# Patient Record
Sex: FEMALE | Race: WHITE | NOT HISPANIC OR LATINO | Employment: OTHER | URBAN - METROPOLITAN AREA
[De-identification: names, ages, dates, MRNs, and addresses within clinical notes are randomized per-mention and may not be internally consistent; named-entity substitution may affect disease eponyms.]

---

## 2020-10-15 ENCOUNTER — HISTORICAL (OUTPATIENT)
Dept: ADMINISTRATIVE | Facility: HOSPITAL | Age: 80
End: 2020-10-15

## 2020-10-15 LAB
ABS NEUT (OLG): 4.94 X10(3)/MCL (ref 2.1–9.2)
ALBUMIN SERPL-MCNC: 4.2 GM/DL (ref 3.4–5)
ALBUMIN/GLOB SERPL: 1.4 RATIO (ref 1.1–2)
ALP SERPL-CCNC: 60 UNIT/L (ref 40–150)
ALT SERPL-CCNC: 14 UNIT/L (ref 0–55)
APPEARANCE, UA: CLEAR
AST SERPL-CCNC: 22 UNIT/L (ref 5–34)
BACTERIA SPEC CULT: ABNORMAL /HPF
BASOPHILS # BLD AUTO: 0 X10(3)/MCL (ref 0–0.2)
BASOPHILS NFR BLD AUTO: 0 %
BILIRUB SERPL-MCNC: 0.3 MG/DL
BILIRUB UR QL STRIP: NEGATIVE
BILIRUBIN DIRECT+TOT PNL SERPL-MCNC: 0.1 MG/DL (ref 0–0.5)
BILIRUBIN DIRECT+TOT PNL SERPL-MCNC: 0.2 MG/DL (ref 0–0.8)
BUN SERPL-MCNC: 18.4 MG/DL (ref 9.8–20.1)
CALCIUM SERPL-MCNC: 9 MG/DL (ref 8.4–10.2)
CHLORIDE SERPL-SCNC: 108 MMOL/L (ref 98–107)
CHOLEST SERPL-MCNC: 146 MG/DL
CHOLEST/HDLC SERPL: 3 {RATIO} (ref 0–5)
CO2 SERPL-SCNC: 24 MMOL/L (ref 23–31)
COLOR UR: ABNORMAL
CREAT SERPL-MCNC: 0.62 MG/DL (ref 0.55–1.02)
CREAT UR-MCNC: 232.7 MG/DL (ref 45–106)
EOSINOPHIL # BLD AUTO: 0.2 X10(3)/MCL (ref 0–0.9)
EOSINOPHIL NFR BLD AUTO: 2 %
ERYTHROCYTE [DISTWIDTH] IN BLOOD BY AUTOMATED COUNT: 12.7 % (ref 11.5–17)
EST. AVERAGE GLUCOSE BLD GHB EST-MCNC: 208.7 MG/DL
GLOBULIN SER-MCNC: 3.1 GM/DL (ref 2.4–3.5)
GLUCOSE (UA): NEGATIVE
GLUCOSE SERPL-MCNC: 130 MG/DL (ref 82–115)
HBA1C MFR BLD: 8.9 %
HCT VFR BLD AUTO: 45.4 % (ref 37–47)
HDLC SERPL-MCNC: 47 MG/DL (ref 35–60)
HGB BLD-MCNC: 15 GM/DL (ref 12–16)
HGB UR QL STRIP: NEGATIVE
KETONES UR QL STRIP: ABNORMAL
LDLC SERPL CALC-MCNC: 28 MG/DL (ref 50–140)
LEUKOCYTE ESTERASE UR QL STRIP: ABNORMAL
LYMPHOCYTES # BLD AUTO: 2.7 X10(3)/MCL (ref 0.6–4.6)
LYMPHOCYTES NFR BLD AUTO: 30 %
MCH RBC QN AUTO: 33.3 PG (ref 27–31)
MCHC RBC AUTO-ENTMCNC: 33 GM/DL (ref 33–36)
MCV RBC AUTO: 100.9 FL (ref 80–94)
MICROALBUMIN UR-MCNC: 30.9 UG/ML
MICROALBUMIN/CREAT RATIO PNL UR: 13.3 MG/GM CR (ref 0–30)
MONOCYTES # BLD AUTO: 1.3 X10(3)/MCL (ref 0.1–1.3)
MONOCYTES NFR BLD AUTO: 14 %
NEUTROPHILS # BLD AUTO: 4.94 X10(3)/MCL (ref 2.1–9.2)
NEUTROPHILS NFR BLD AUTO: 54 %
NITRITE UR QL STRIP: NEGATIVE
PH UR STRIP: 5 [PH] (ref 5–9)
PLATELET # BLD AUTO: 348 X10(3)/MCL (ref 130–400)
PMV BLD AUTO: 10.1 FL (ref 9.4–12.4)
POTASSIUM SERPL-SCNC: 6.1 MMOL/L (ref 3.5–5.1)
PROT SERPL-MCNC: 7.3 GM/DL (ref 5.8–7.6)
PROT UR QL STRIP: ABNORMAL
RBC # BLD AUTO: 4.5 X10(6)/MCL (ref 4.2–5.4)
RBC #/AREA URNS HPF: ABNORMAL /[HPF]
SODIUM SERPL-SCNC: 148 MMOL/L (ref 136–145)
SP GR UR STRIP: 1.03 (ref 1–1.03)
SQUAMOUS EPITHELIAL, UA: ABNORMAL
TRIGL SERPL-MCNC: 355 MG/DL (ref 37–140)
TSH SERPL-ACNC: 0.63 UIU/ML (ref 0.35–4.94)
UROBILINOGEN UR STRIP-ACNC: 0.2
VLDLC SERPL CALC-MCNC: 71 MG/DL
WBC # SPEC AUTO: 9.2 X10(3)/MCL (ref 4.5–11.5)
WBC #/AREA URNS HPF: ABNORMAL /[HPF]

## 2020-11-02 ENCOUNTER — HISTORICAL (OUTPATIENT)
Dept: ADMINISTRATIVE | Facility: HOSPITAL | Age: 80
End: 2020-11-02

## 2020-11-02 LAB
BUN SERPL-MCNC: 19.2 MG/DL (ref 9.8–20.1)
CALCIUM SERPL-MCNC: 9.2 MG/DL (ref 8.4–10.2)
CHLORIDE SERPL-SCNC: 105 MMOL/L (ref 98–107)
CO2 SERPL-SCNC: 22 MMOL/L (ref 23–31)
CREAT SERPL-MCNC: 0.83 MG/DL (ref 0.55–1.02)
CREAT/UREA NIT SERPL: 23
GLUCOSE SERPL-MCNC: 141 MG/DL (ref 82–115)
POTASSIUM SERPL-SCNC: 4.9 MMOL/L (ref 3.5–5.1)
SODIUM SERPL-SCNC: 138 MMOL/L (ref 136–145)

## 2021-03-11 ENCOUNTER — HISTORICAL (OUTPATIENT)
Dept: ADMINISTRATIVE | Facility: HOSPITAL | Age: 81
End: 2021-03-11

## 2021-03-11 LAB
ABS NEUT (OLG): 5.9 X10(3)/MCL (ref 2.1–9.2)
ALBUMIN SERPL-MCNC: 4 GM/DL (ref 3.4–4.8)
ALBUMIN/GLOB SERPL: 1.1 RATIO (ref 1.1–2)
ALP SERPL-CCNC: 69 UNIT/L (ref 40–150)
ALT SERPL-CCNC: 16 UNIT/L (ref 0–55)
AST SERPL-CCNC: 23 UNIT/L (ref 5–34)
BASOPHILS # BLD AUTO: 0 X10(3)/MCL (ref 0–0.2)
BASOPHILS NFR BLD AUTO: 0 %
BILIRUB SERPL-MCNC: 0.3 MG/DL
BILIRUBIN DIRECT+TOT PNL SERPL-MCNC: 0.1 MG/DL (ref 0–0.5)
BILIRUBIN DIRECT+TOT PNL SERPL-MCNC: 0.2 MG/DL (ref 0–0.8)
BNP BLD-MCNC: 20.6 PG/ML
BUN SERPL-MCNC: 21.6 MG/DL (ref 9.8–20.1)
CALCIUM SERPL-MCNC: 9.1 MG/DL (ref 8.4–10.2)
CHLORIDE SERPL-SCNC: 104 MMOL/L (ref 98–107)
CHOLEST SERPL-MCNC: 171 MG/DL
CHOLEST/HDLC SERPL: 3 {RATIO} (ref 0–5)
CO2 SERPL-SCNC: 22 MMOL/L (ref 23–31)
CREAT SERPL-MCNC: 1.05 MG/DL (ref 0.55–1.02)
EOSINOPHIL # BLD AUTO: 0.2 X10(3)/MCL (ref 0–0.9)
EOSINOPHIL NFR BLD AUTO: 2 %
ERYTHROCYTE [DISTWIDTH] IN BLOOD BY AUTOMATED COUNT: 12.7 % (ref 11.5–17)
EST. AVERAGE GLUCOSE BLD GHB EST-MCNC: 197.2 MG/DL
GLOBULIN SER-MCNC: 3.6 GM/DL (ref 2.4–3.5)
GLUCOSE SERPL-MCNC: 205 MG/DL (ref 82–115)
HBA1C MFR BLD: 8.5 %
HCT VFR BLD AUTO: 42.3 % (ref 37–47)
HDLC SERPL-MCNC: 56 MG/DL (ref 35–60)
HGB BLD-MCNC: 13.6 GM/DL (ref 12–16)
LDLC SERPL CALC-MCNC: 37 MG/DL (ref 50–140)
LYMPHOCYTES # BLD AUTO: 2.5 X10(3)/MCL (ref 0.6–4.6)
LYMPHOCYTES NFR BLD AUTO: 26 %
MCH RBC QN AUTO: 33 PG (ref 27–31)
MCHC RBC AUTO-ENTMCNC: 32.2 GM/DL (ref 33–36)
MCV RBC AUTO: 102.7 FL (ref 80–94)
MONOCYTES # BLD AUTO: 1.1 X10(3)/MCL (ref 0.1–1.3)
MONOCYTES NFR BLD AUTO: 11 %
NEUTROPHILS # BLD AUTO: 5.9 X10(3)/MCL (ref 2.1–9.2)
NEUTROPHILS NFR BLD AUTO: 60 %
PLATELET # BLD AUTO: 294 X10(3)/MCL (ref 130–400)
PMV BLD AUTO: 10.2 FL (ref 9.4–12.4)
POTASSIUM SERPL-SCNC: 5.1 MMOL/L (ref 3.5–5.1)
PROT SERPL-MCNC: 7.6 GM/DL (ref 5.8–7.6)
RBC # BLD AUTO: 4.12 X10(6)/MCL (ref 4.2–5.4)
SODIUM SERPL-SCNC: 137 MMOL/L (ref 136–145)
TRIGL SERPL-MCNC: 392 MG/DL (ref 37–140)
TSH SERPL-ACNC: 1.81 UIU/ML (ref 0.35–4.94)
VLDLC SERPL CALC-MCNC: 78 MG/DL
WBC # SPEC AUTO: 9.8 X10(3)/MCL (ref 4.5–11.5)

## 2021-03-25 ENCOUNTER — HISTORICAL (OUTPATIENT)
Dept: ADMINISTRATIVE | Facility: HOSPITAL | Age: 81
End: 2021-03-25

## 2021-04-06 ENCOUNTER — HISTORICAL (OUTPATIENT)
Dept: CARDIOLOGY | Facility: HOSPITAL | Age: 81
End: 2021-04-06

## 2021-12-06 ENCOUNTER — HISTORICAL (OUTPATIENT)
Dept: RESPIRATORY THERAPY | Facility: HOSPITAL | Age: 81
End: 2021-12-06

## 2021-12-07 ENCOUNTER — HISTORICAL (OUTPATIENT)
Dept: ADMINISTRATIVE | Facility: HOSPITAL | Age: 81
End: 2021-12-07

## 2021-12-07 LAB
APPEARANCE, UA: ABNORMAL
BACTERIA SPEC CULT: ABNORMAL /HPF
BILIRUB UR QL STRIP: NEGATIVE
BUN SERPL-MCNC: 25 MG/DL (ref 9.8–20.1)
CALCIUM SERPL-MCNC: 9.4 MG/DL (ref 8.7–10.5)
CHLORIDE SERPL-SCNC: 106 MMOL/L (ref 98–107)
CO2 SERPL-SCNC: 22 MMOL/L (ref 23–31)
COLOR UR: YELLOW
CREAT SERPL-MCNC: 1.01 MG/DL (ref 0.55–1.02)
CREAT UR-MCNC: 119 MG/DL (ref 45–106)
CREAT/UREA NIT SERPL: 25
EST. AVERAGE GLUCOSE BLD GHB EST-MCNC: 274.7 MG/DL
GLUCOSE (UA): ABNORMAL
GLUCOSE SERPL-MCNC: 239 MG/DL (ref 82–115)
HBA1C MFR BLD: 11.2 %
HGB UR QL STRIP: NEGATIVE
KETONES UR QL STRIP: NEGATIVE
LEUKOCYTE ESTERASE UR QL STRIP: ABNORMAL
MICROALBUMIN UR-MCNC: 250.6 UG/ML
MICROALBUMIN/CREAT RATIO PNL UR: 210.6 MG/GM CR (ref 0–30)
NITRITE UR QL STRIP: POSITIVE
PH UR STRIP: 5 [PH] (ref 5–9)
POTASSIUM SERPL-SCNC: 5.5 MMOL/L (ref 3.5–5.1)
PROT UR QL STRIP: ABNORMAL
RBC #/AREA URNS HPF: ABNORMAL /[HPF]
SODIUM SERPL-SCNC: 139 MMOL/L (ref 136–145)
SP GR UR STRIP: 1.03 (ref 1–1.03)
SQUAMOUS EPITHELIAL, UA: ABNORMAL /HPF (ref 0–4)
UROBILINOGEN UR STRIP-ACNC: 0.2
WBC #/AREA URNS HPF: 90 /HPF (ref 0–3)

## 2022-01-01 ENCOUNTER — TELEPHONE (OUTPATIENT)
Dept: INTERNAL MEDICINE | Facility: CLINIC | Age: 82
End: 2022-01-01
Payer: MEDICARE

## 2022-01-01 ENCOUNTER — HISTORICAL (OUTPATIENT)
Dept: ADMINISTRATIVE | Facility: HOSPITAL | Age: 82
End: 2022-01-01

## 2022-01-01 ENCOUNTER — CLINICAL SUPPORT (OUTPATIENT)
Dept: INTERNAL MEDICINE | Facility: CLINIC | Age: 82
End: 2022-01-01
Payer: MEDICARE

## 2022-01-01 ENCOUNTER — HOSPITAL ENCOUNTER (OUTPATIENT)
Dept: RADIOLOGY | Facility: HOSPITAL | Age: 82
Discharge: HOME | End: 2022-08-12
Payer: MEDICARE

## 2022-01-01 ENCOUNTER — OFFICE VISIT (OUTPATIENT)
Dept: INTERNAL MEDICINE | Facility: CLINIC | Age: 82
End: 2022-01-01
Payer: MEDICARE

## 2022-01-01 ENCOUNTER — EXTERNAL HOME HEALTH (OUTPATIENT)
Dept: HOME HEALTH SERVICES | Facility: HOSPITAL | Age: 82
End: 2022-01-01
Payer: MEDICARE

## 2022-01-01 ENCOUNTER — DOCUMENT SCAN (OUTPATIENT)
Dept: HOME HEALTH SERVICES | Facility: HOSPITAL | Age: 82
End: 2022-01-01
Payer: MEDICARE

## 2022-01-01 ENCOUNTER — DOCUMENTATION ONLY (OUTPATIENT)
Dept: ADMINISTRATIVE | Facility: HOSPITAL | Age: 82
End: 2022-01-01
Payer: MEDICARE

## 2022-01-01 VITALS
OXYGEN SATURATION: 97 % | WEIGHT: 169.5 LBS | TEMPERATURE: 97 F | BODY MASS INDEX: 30.03 KG/M2 | DIASTOLIC BLOOD PRESSURE: 62 MMHG | SYSTOLIC BLOOD PRESSURE: 114 MMHG | HEIGHT: 63 IN | RESPIRATION RATE: 16 BRPM | HEART RATE: 110 BPM

## 2022-01-01 VITALS
RESPIRATION RATE: 16 BRPM | HEART RATE: 105 BPM | BODY MASS INDEX: 31.87 KG/M2 | WEIGHT: 179.88 LBS | SYSTOLIC BLOOD PRESSURE: 136 MMHG | DIASTOLIC BLOOD PRESSURE: 74 MMHG | HEIGHT: 63 IN | TEMPERATURE: 97 F | OXYGEN SATURATION: 98 %

## 2022-01-01 VITALS
OXYGEN SATURATION: 96 % | DIASTOLIC BLOOD PRESSURE: 56 MMHG | SYSTOLIC BLOOD PRESSURE: 134 MMHG | HEIGHT: 64 IN | BODY MASS INDEX: 30.9 KG/M2 | WEIGHT: 181 LBS

## 2022-01-01 DIAGNOSIS — B37.9 YEAST INFECTION: Primary | ICD-10-CM

## 2022-01-01 DIAGNOSIS — R05.9 COUGH: ICD-10-CM

## 2022-01-01 DIAGNOSIS — E11.65 TYPE 2 DIABETES MELLITUS WITH HYPERGLYCEMIA, WITHOUT LONG-TERM CURRENT USE OF INSULIN: Primary | ICD-10-CM

## 2022-01-01 DIAGNOSIS — R06.09 DOE (DYSPNEA ON EXERTION): ICD-10-CM

## 2022-01-01 DIAGNOSIS — E03.9 HYPOTHYROIDISM, UNSPECIFIED TYPE: ICD-10-CM

## 2022-01-01 DIAGNOSIS — Z23 FLU VACCINE NEED: Primary | ICD-10-CM

## 2022-01-01 DIAGNOSIS — J44.9 CHRONIC OBSTRUCTIVE PULMONARY DISEASE, UNSPECIFIED COPD TYPE: ICD-10-CM

## 2022-01-01 DIAGNOSIS — I50.9 CONGESTIVE HEART FAILURE, UNSPECIFIED HF CHRONICITY, UNSPECIFIED HEART FAILURE TYPE: Primary | ICD-10-CM

## 2022-01-01 DIAGNOSIS — J45.909 ASTHMA, UNSPECIFIED ASTHMA SEVERITY, UNSPECIFIED WHETHER COMPLICATED, UNSPECIFIED WHETHER PERSISTENT: ICD-10-CM

## 2022-01-01 DIAGNOSIS — R09.81 SINUS CONGESTION: Primary | ICD-10-CM

## 2022-01-01 DIAGNOSIS — E55.9 VITAMIN D DEFICIENCY: ICD-10-CM

## 2022-01-01 DIAGNOSIS — R09.81 SINUS CONGESTION: ICD-10-CM

## 2022-01-01 DIAGNOSIS — E66.9 OBESITY, UNSPECIFIED CLASSIFICATION, UNSPECIFIED OBESITY TYPE, UNSPECIFIED WHETHER SERIOUS COMORBIDITY PRESENT: ICD-10-CM

## 2022-01-01 LAB
ABS NEUT (OLG): 4.76 (ref 2.1–9.2)
ALBUMIN SERPL-MCNC: 3.8 G/DL (ref 3.4–4.8)
ALBUMIN/GLOB SERPL: 1.2 {RATIO} (ref 1.1–2)
ALP SERPL-CCNC: 50 U/L (ref 40–150)
ALT SERPL-CCNC: 19 U/L (ref 0–55)
ANION GAP SERPL CALC-SCNC: 12 MEQ/L
APPEARANCE, UA: CLEAR
AST SERPL-CCNC: 24 U/L (ref 5–34)
BACTERIA SPEC CULT: NORMAL
BASOPHILS # BLD AUTO: 0 10*3/UL (ref 0–0.2)
BASOPHILS NFR BLD AUTO: 1 %
BILIRUB SERPL-MCNC: 0.3 MG/DL
BILIRUB UR QL STRIP: NEGATIVE
BILIRUBIN DIRECT+TOT PNL SERPL-MCNC: 0.1 (ref 0–0.5)
BILIRUBIN DIRECT+TOT PNL SERPL-MCNC: 0.2 (ref 0–0.8)
BUDDING YEAST: NORMAL
BUN SERPL-MCNC: 15.8 MG/DL (ref 9.8–20.1)
BUN SERPL-MCNC: 19.6 MG/DL (ref 9.8–20.1)
CALCIUM SERPL-MCNC: 9.1 MG/DL (ref 8.7–10.5)
CALCIUM SERPL-MCNC: 9.4 MG/DL (ref 8.4–10.2)
CASTS, UA: NORMAL
CHLORIDE SERPL-SCNC: 107 MMOL/L (ref 98–107)
CHLORIDE SERPL-SCNC: 109 MMOL/L (ref 98–107)
CHOLEST SERPL-MCNC: 133 MG/DL
CHOLEST/HDLC SERPL: 3 {RATIO} (ref 0–5)
CO2 SERPL-SCNC: 16 MMOL/L (ref 23–31)
CO2 SERPL-SCNC: 20 MMOL/L (ref 23–31)
COLOR UR: YELLOW
CREAT SERPL-MCNC: 0.82 MG/DL (ref 0.55–1.02)
CREAT SERPL-MCNC: 0.94 MG/DL (ref 0.55–1.02)
CREAT UR-MCNC: 150.9 MG/DL (ref 45–106)
CREAT/UREA NIT SERPL: 21
CRYSTALS: NORMAL
DEPRECATED CALCIDIOL+CALCIFEROL SERPL-MC: 24.1 NG/ML (ref 30–80)
EOSINOPHIL # BLD AUTO: 0.1 10*3/UL (ref 0–0.9)
EOSINOPHIL NFR BLD AUTO: 2 %
ERYTHROCYTE [DISTWIDTH] IN BLOOD BY AUTOMATED COUNT: 12.4 % (ref 11.5–17)
EST. AVERAGE GLUCOSE BLD GHB EST-MCNC: 148.5 MG/DL
EST. AVERAGE GLUCOSE BLD GHB EST-MCNC: 151.3 MG/DL
GLOBULIN SER-MCNC: 3.3 G/DL (ref 2.4–3.5)
GLUCOSE (UA): NEGATIVE
GLUCOSE SERPL-MCNC: 165 MG/DL (ref 82–115)
GLUCOSE SERPL-MCNC: 169 MG/DL (ref 82–115)
HBA1C MFR BLD: 6.8 %
HBA1C MFR BLD: 6.9 %
HCT VFR BLD AUTO: 38.7 % (ref 37–47)
HDLC SERPL-MCNC: 53 MG/DL (ref 35–60)
HEMOLYSIS INTERF INDEX SERPL-ACNC: 36
HGB BLD-MCNC: 12.7 G/DL (ref 12–16)
HGB UR QL STRIP: NEGATIVE
ICTERIC INTERF INDEX SERPL-ACNC: 0
KETONES UR QL STRIP: NORMAL
LDLC SERPL CALC-MCNC: 37 MG/DL (ref 50–140)
LEFT EYE DM RETINOPATHY: NEGATIVE
LEUKOCYTE ESTERASE UR QL STRIP: NORMAL
LIPEMIC INTERF INDEX SERPL-ACNC: 2
LYMPHOCYTES # BLD AUTO: 2.5 10*3/UL (ref 0.6–4.6)
LYMPHOCYTES NFR BLD AUTO: 29 %
MANUAL DIFF? (OHS): NO
MCH RBC QN AUTO: 33.1 PG (ref 27–31)
MCHC RBC AUTO-ENTMCNC: 32.8 G/DL (ref 33–36)
MCV RBC AUTO: 100.8 FL (ref 80–94)
MICROALBUMIN UR-MCNC: 36.9
MICROALBUMIN/CREAT RATIO PNL UR: 24.5 (ref 0–30)
MONOCYTES # BLD AUTO: 1.1 10*3/UL (ref 0.1–1.3)
MONOCYTES NFR BLD AUTO: 12 %
NEUTROPHILS # BLD AUTO: 4.76 10*3/UL (ref 2.1–9.2)
NEUTROPHILS NFR BLD AUTO: 56 %
NITRITE UR QL STRIP: NEGATIVE
PH UR STRIP: 5.5 [PH] (ref 5–9)
PLATELET # BLD AUTO: 269 10*3/UL (ref 130–400)
PMV BLD AUTO: 10.2 FL (ref 9.4–12.4)
POTASSIUM SERPL-SCNC: 4.4 MMOL/L (ref 3.5–5.1)
POTASSIUM SERPL-SCNC: 5.1 MMOL/L (ref 3.5–5.1)
PROT SERPL-MCNC: 7.1 G/DL (ref 5.8–7.6)
PROT UR QL STRIP: NEGATIVE
RBC # BLD AUTO: 3.84 10*6/UL (ref 4.2–5.4)
RBC #/AREA URNS HPF: NORMAL /[HPF] (ref 0–2)
RIGHT EYE DM RETINOPATHY: NEGATIVE
SMALL ROUND CELLS, UA: NORMAL
SODIUM SERPL-SCNC: 136 MMOL/L (ref 136–145)
SODIUM SERPL-SCNC: 141 MMOL/L (ref 136–145)
SP GR UR STRIP: 1.02 (ref 1–1.03)
SPERM URNS QL MICRO: NORMAL
SQUAMOUS EPITHELIAL, UA: NORMAL (ref 0–4)
TRIGL SERPL-MCNC: 214 MG/DL (ref 37–140)
TSH SERPL-ACNC: 0.2 M[IU]/L (ref 0.35–4.94)
UROBILINOGEN UR STRIP-ACNC: 0.2
VIT B12 SERPL-MCNC: 286 PG/ML (ref 213–816)
VLDLC SERPL CALC-MCNC: 43 MG/DL
WBC # SPEC AUTO: 8.5 10*3/UL (ref 4.5–11.5)
WBC #/AREA URNS HPF: NORMAL /[HPF] (ref 0–2)

## 2022-01-01 PROCEDURE — G0179 MD RECERTIFICATION HHA PT: HCPCS | Mod: ,,, | Performed by: INTERNAL MEDICINE

## 2022-01-01 PROCEDURE — 99214 PR OFFICE/OUTPT VISIT, EST, LEVL IV, 30-39 MIN: ICD-10-PCS | Mod: ,,, | Performed by: INTERNAL MEDICINE

## 2022-01-01 PROCEDURE — G0179 PR HOME HEALTH MD RECERTIFICATION: ICD-10-PCS | Mod: ,,, | Performed by: INTERNAL MEDICINE

## 2022-01-01 PROCEDURE — 71046 X-RAY EXAM CHEST 2 VIEWS: CPT | Mod: TC

## 2022-01-01 PROCEDURE — 80048 BASIC METABOLIC PNL TOTAL CA: CPT | Performed by: INTERNAL MEDICINE

## 2022-01-01 PROCEDURE — G0180 MD CERTIFICATION HHA PATIENT: HCPCS | Mod: ,,, | Performed by: INTERNAL MEDICINE

## 2022-01-01 PROCEDURE — 99213 OFFICE O/P EST LOW 20 MIN: CPT | Mod: ,,, | Performed by: INTERNAL MEDICINE

## 2022-01-01 PROCEDURE — 83036 HEMOGLOBIN GLYCOSYLATED A1C: CPT | Performed by: INTERNAL MEDICINE

## 2022-01-01 PROCEDURE — 99214 OFFICE O/P EST MOD 30 MIN: CPT | Mod: ,,, | Performed by: INTERNAL MEDICINE

## 2022-01-01 PROCEDURE — 99213 PR OFFICE/OUTPT VISIT, EST, LEVL III, 20-29 MIN: ICD-10-PCS | Mod: ,,, | Performed by: INTERNAL MEDICINE

## 2022-01-01 PROCEDURE — 90694 VACC AIIV4 NO PRSRV 0.5ML IM: CPT | Mod: ,,, | Performed by: INTERNAL MEDICINE

## 2022-01-01 PROCEDURE — G0008 ADMIN INFLUENZA VIRUS VAC: HCPCS | Mod: ,,, | Performed by: INTERNAL MEDICINE

## 2022-01-01 PROCEDURE — G0008 FLU VACCINE - QUADRIVALENT - ADJUVANTED: ICD-10-PCS | Mod: ,,, | Performed by: INTERNAL MEDICINE

## 2022-01-01 PROCEDURE — 90694 FLU VACCINE - QUADRIVALENT - ADJUVANTED: ICD-10-PCS | Mod: ,,, | Performed by: INTERNAL MEDICINE

## 2022-01-01 PROCEDURE — G0180 PR HOME HEALTH MD CERTIFICATION: ICD-10-PCS | Mod: ,,, | Performed by: INTERNAL MEDICINE

## 2022-01-01 PROCEDURE — 36415 COLL VENOUS BLD VENIPUNCTURE: CPT | Performed by: INTERNAL MEDICINE

## 2022-01-01 RX ORDER — PANTOPRAZOLE SODIUM 40 MG/1
40 TABLET, DELAYED RELEASE ORAL DAILY
Qty: 30 TABLET | Refills: 2 | Status: SHIPPED | OUTPATIENT
Start: 2022-01-01 | End: 2022-01-01 | Stop reason: SDUPTHER

## 2022-01-01 RX ORDER — PANTOPRAZOLE SODIUM 40 MG/1
40 TABLET, DELAYED RELEASE ORAL DAILY
Qty: 90 TABLET | Refills: 3 | Status: SHIPPED | OUTPATIENT
Start: 2022-01-01

## 2022-01-01 RX ORDER — GLIPIZIDE 5 MG/1
5 TABLET, FILM COATED, EXTENDED RELEASE ORAL DAILY
COMMUNITY
Start: 2021-12-23

## 2022-01-01 RX ORDER — METHYLPREDNISOLONE 4 MG/1
TABLET ORAL
Qty: 21 EACH | Refills: 0 | Status: SHIPPED | OUTPATIENT
Start: 2022-01-01 | End: 2022-01-01

## 2022-01-01 RX ORDER — DICLOFENAC SODIUM 10 MG/G
2 GEL TOPICAL DAILY
COMMUNITY

## 2022-01-01 RX ORDER — FLUCONAZOLE 150 MG/1
150 TABLET ORAL DAILY
Qty: 1 TABLET | Refills: 0 | Status: SHIPPED | OUTPATIENT
Start: 2022-01-01 | End: 2022-01-01

## 2022-01-01 RX ORDER — PANTOPRAZOLE SODIUM 40 MG/1
40 TABLET, DELAYED RELEASE ORAL DAILY
Qty: 90 TABLET | Refills: 3 | Status: SHIPPED | OUTPATIENT
Start: 2022-01-01 | End: 2022-01-01 | Stop reason: SDUPTHER

## 2022-01-01 RX ORDER — METHYLPREDNISOLONE 4 MG/1
TABLET ORAL
Qty: 21 EACH | Refills: 0 | Status: SHIPPED | OUTPATIENT
Start: 2022-01-01 | End: 2022-01-01 | Stop reason: SDUPTHER

## 2022-01-01 RX ORDER — DICLOFENAC SODIUM 1 MG/ML
1 SOLUTION/ DROPS OPHTHALMIC 4 TIMES DAILY
COMMUNITY

## 2022-06-14 NOTE — TELEPHONE ENCOUNTER
----- Message from Baudilio Martinez sent at 6/14/2022  8:06 AM CDT -----  Pantoprazole SOD EC TB ([facility])

## 2022-06-14 NOTE — TELEPHONE ENCOUNTER
----- Message from Billy Jason sent at 6/14/2022  1:22 PM CDT -----  .Type:  Needs Medical Advice    Who Called: Haley  Symptoms (please be specific):    How long has patient had these symptoms:    Pharmacy name and phone #:    Would the patient rather a call back or a response via [facility]?   Best Call Back Number: [phone]   Additional Information: She has a question about her rx Pantotrazole, [facility] is telling her that the doctor has cancelled the rx.

## 2022-06-15 NOTE — TELEPHONE ENCOUNTER
----- Message from Billy Jason sent at 6/15/2022  9:22 AM CDT -----  .Type:  Needs Medical Advice    Who Called: Haley  Symptoms (please be specific):    How long has patient had these symptoms:    Pharmacy name and phone #:    Would the patient rather a call back or a response via [facility]?   Best Call Back Number: [phone]  Additional Information: The patient needs a call back a mistake was made on her medication. She has spoken with a nurse late yesterday afternoon. She told me she spent the day on the phone trying to get her medication and now it is not the correct one.

## 2022-06-22 NOTE — TELEPHONE ENCOUNTER
----- Message from Yesenia Kemp MA sent at 6/22/2022  3:30 PM CDT -----  Regarding: Previsit 06.30.22 @2  Visit w/Cesar Valdez     1. Are there any outstanding tasks in the patient's chart?  No      2.  Is there any documentation of task in the chart? No      3.  Has the patient been in an ER, urgent care clinic, or been admitted since the last visit? No      4. Has the patient seen any other healthcare providers (doctors) since the last visit? No      5.  Has the patient had any blood work or x-rays done since the last visit? No      6. Is patient signed up for patient portal?

## 2022-06-30 PROBLEM — E11.65 TYPE 2 DIABETES MELLITUS WITH HYPERGLYCEMIA, WITHOUT LONG-TERM CURRENT USE OF INSULIN: Status: ACTIVE | Noted: 2022-01-01

## 2022-06-30 PROBLEM — J44.9 CHRONIC OBSTRUCTIVE PULMONARY DISEASE, UNSPECIFIED COPD TYPE: Status: ACTIVE | Noted: 2022-01-01

## 2022-06-30 NOTE — PROGRESS NOTES
Subjective:      Patient ID: Haley Dumont is a 82 y.o. female.    Chief Complaint: Diabetes (3 month F/U)      HPI:  81-year-old  female presents to clinic for DM revisit  Last A1C 6.9 3-2022  Patient states she lives with her elderly , she herself has a history of stroke, states she is unable to give herself insulin injections.   Previous PCP in [place]  Dr. Reed for cardiology--history of stroke, PTCA with stent, CHF  Dr. Pleitez.  Last C-scope in 2010, does not want repeat  Fasting sugar of 136  Needs labs today  Needs diabetic eye exam  PCV 20---we are currently out        Problem List Items Addressed This Visit        Pulmonary    Chronic obstructive pulmonary disease, unspecified COPD type       Endocrine    Type 2 diabetes mellitus with hyperglycemia, without long-term current use of insulin - Primary    Current Assessment & Plan     Recheck A1c and BMP today  Referral for diabetic eye exam           Relevant Medications    glipiZIDE (GLUCOTROL) 5 MG TR24    Other Relevant Orders    Ambulatory referral/consult to Optometry    Basic Metabolic Panel    Hemoglobin A1C              Past Medical History:  Past Medical History:   Diagnosis Date    DM (diabetes mellitus)     Hypothyroidism, unspecified     Mild intermittent asthma, uncomplicated     Obesity, unspecified      Past Surgical History:   Procedure Laterality Date    CATARACT EXTRACTION  2010    HYSTERECTOMY  1974     Review of patient's allergies indicates:   Allergen Reactions    Morphine Anaphylaxis    Oxycodone-aspirin Anaphylaxis    Shellfish containing products      Other reaction(s): Hives    Sulfa (sulfonamide antibiotics)      Other reaction(s): Hives     Current Outpatient Medications on File Prior to Visit   Medication Sig Dispense Refill    amLODIPine (NORVASC) 10 MG tablet       blood sugar diagnostic (ACCU-CHEK GUIDE TEST STRIPS [facility])   Accu Chek Test strips, See Instructions, Use to test Blood glucose once a  day, # 50 EA, 11 Refill(s), Pharmacy: [facility] [address] [id number], 163, cm, Height/Length Dosing, 12/13/21 15:53:00 CST, 82.1, kg, Weight Dosing, 12/13/21 15:53:00 CST      blood sugar diagnostic Strp   Accu Chek Glucometer, See Instructions, Use to check Blood sugar Once a day, # 1 EA, 0 Refill(s), Pharmacy: [facility] [address] [facility], 163, cm, Height/Length Dosing, 12/13/21 15:53:00 CST, 82.1, kg, Weight Dosing, 12/13/21 15:53:00 CST      clopidogreL (PLAVIX) 75 mg tablet       diclofenac sodium (VOLTAREN) 1 % Gel Apply 2 g topically once daily.      empagliflozin (JARDIANCE) 10 mg tablet Take 10 mg by mouth once daily.      fluticasone propionate (FLOVENT DISKUS) 100 mcg/actuation inhaler Inhale into the lungs 2 (two) times daily. Controller      lancing device/lancets (ACCU-CHEK SOFT DEV LANCETS MISC)   Accu Chek Lancets, See Instructions, Use to test Blood glucose once a day, # 50 EA, 11 Refill(s), Pharmacy: [facility] [address] [facility], 163, cm, Height/Length Dosing, 12/13/21 15:53:00 CST, 82.1, kg, Weight Dosing, 12/13/21 15:53:00 CST      levothyroxine (SYNTHROID) 125 MCG tablet       lisinopriL (PRINIVIL,ZESTRIL) 2.5 MG tablet       metFORMIN (GLUCOPHAGE) 1000 MG tablet once daily at 6am.      metoprolol succinate (TOPROL-XL) 100 MG 24 hr tablet       montelukast (SINGULAIR) 10 mg tablet       rosuvastatin (CRESTOR) 40 MG Tab Take 10 mg by mouth every evening.      [DISCONTINUED] pantoprazole (PROTONIX) 40 MG tablet Take 1 tablet (40 mg total) by mouth once daily. 90 tablet 3    diclofenac (VOLTAREN) 0.1 % ophthalmic solution 1 drop 4 (four) times daily.      glipiZIDE (GLUCOTROL) 5 MG TR24 Take 5 mg by mouth Daily.      [DISCONTINUED] semaglutide (OZEMPIC) 0.25 mg or 0.5 mg(2 mg/1.5 mL) pen injector Inject 0.5 mg into the skin every 7 days.       No current facility-administered medications on file prior to visit.     Social History     Socioeconomic History    Marital status:    Tobacco Use     "Smoking status: Never Smoker    Smokeless tobacco: Never Used   Substance and Sexual Activity    Alcohol use: Not Currently    Drug use: Never    Sexual activity: Not Currently     History reviewed. No pertinent family history.        Review of Systems  Constitutional: No fever,  no fatigue, no chills, no night sweats, no weight gain, no weight loss, no changes in appetite.   Eye: No redness, no acute vision loss, no blurred vision, no double vision, no eye pain  ENMT: No sore throat, no nasal drainage, no nose bleeds,  no headache, no ear pain, no ear drainage, no acute hearing loss  Respiratory: No cough, no sputum production, no shortness of breath, no hemoptysis, no wheezing.  Cardiovascular: No chest pain, no chest tightness, no MOTA, no PND, no orthopnea, no swelling, no palpitations.  Gastrointestinal: No abdominal pain, no nausea, no vomiting, no diarrhea, no constipation, no difficulty swallowing, no change in bowel habits, no rectal bleeding  Genitourinary: no urgency, no frequency, no burning or pain when urinating, no blood in urine, no incontinence  Heme/Lymph: No easy bruising and/or bleeding, no swollen or painful glands.  Endocrine: No polyuria, no polydipsia, no polyphagia, no heat or cold intolerance.  Musculoskeletal: No muscle pain, no muscle weakness, no joint pain, no red or swollen joints.  Integumentary: No rash, no pruritis, no hair or nail changes.  Neurologic: No dizziness, no fainting, no tremors, no tingling and/ or numbness.  Psychiatric: No anxiety, no depression, no memory loss  All Other ROS: Negative with exception of what is documented in the history of present illness     Objective:   /74 (BP Location: Left arm, Patient Position: Sitting, BP Method: Medium (Manual))   Pulse 105   Temp 97.2 °F (36.2 °C) (Temporal)   Resp 16   Ht 5' 3" (1.6 m)   Wt 81.6 kg (179 lb 14.4 oz)   SpO2 98%   BMI 31.87 kg/m²     Physical Exam  General : Alert and oriented, No acute " distress, well, developed, well nourished, afebrile   Eye : PERRLA. EOMI.   HEENT : Normocephalic. Neck supple.  Musculoskeletal : Normal ROM.  No muscle tenderness.  Integumentary : Warm to touch. Intact. No rash.    Neurologic : Alert and oriented. No focal deficits.   Psychiatric : Cooperative, Appropriate mood & affect. Normal judgment.          Assessment:     1. Type 2 diabetes mellitus with hyperglycemia, without long-term current use of insulin    2. Chronic obstructive pulmonary disease, unspecified COPD type                  Plan:       I have discontinued Haley Dumont's semaglutide. I am also having her maintain her amLODIPine, metFORMIN, clopidogreL, levothyroxine, lisinopriL, metoprolol succinate, montelukast, fluticasone propionate, rosuvastatin, JARDIANCE, blood sugar diagnostic, lancing device/lancets (ACCU-CHEK SOFT DEV LANCETS MISC), blood sugar diagnostic (ACCU-CHEK GUIDE TEST STRIPS MISC), glipiZIDE, diclofenac, diclofenac sodium, and pantoprazole.      Problem List Items Addressed This Visit        Pulmonary    Chronic obstructive pulmonary disease, unspecified COPD type       Endocrine    Type 2 diabetes mellitus with hyperglycemia, without long-term current use of insulin - Primary     Recheck A1c and BMP today  Referral for diabetic eye exam           Relevant Medications    glipiZIDE (GLUCOTROL) 5 MG TR24    Other Relevant Orders    Ambulatory referral/consult to Optometry    Basic Metabolic Panel    Hemoglobin A1C            Haley was seen today for diabetes.    Diagnoses and all orders for this visit:    Type 2 diabetes mellitus with hyperglycemia, without long-term current use of insulin  -     Ambulatory referral/consult to Optometry; Future  -     Cancel: Hemoglobin A1C; Future  -     Basic Metabolic Panel  -     Hemoglobin A1C    Chronic obstructive pulmonary disease, unspecified COPD type    Other orders  -     pantoprazole (PROTONIX) 40 MG tablet; Take 1 tablet (40 mg total) by  mouth once daily.            Medications Ordered This Encounter   Medications    pantoprazole (PROTONIX) 40 MG tablet     Sig: Take 1 tablet (40 mg total) by mouth once daily.     Dispense:  90 tablet     Refill:  3     [unfilled]  Orders Placed This Encounter   Procedures    Basic Metabolic Panel    Hemoglobin A1C    Ambulatory referral/consult to Optometry     Standing Status:   Future     Standing Expiration Date:   7/30/2023     Referral Priority:   Routine     Referral Type:   [facility] (Optometry)     Referral Reason:   Specialty Services Required     Referred to Provider:   Will Stern OD     Requested Specialty:   Optometry     Number of Visits Requested:   1       Medication List with Changes/Refills   Current Medications    AMLODIPINE (NORVASC) 10 MG TABLET        BLOOD SUGAR DIAGNOSTIC (ACCU-CHEK GUIDE TEST STRIPS [facility]      Accu Chek Test strips, See Instructions, Use to test Blood glucose once a day, # 50 EA, 11 Refill(s), Pharmacy: [place] [facility] [address] [id number], 163, cm, Height/Length Dosing, 12/13/21 15:53:00 CST, 82.1, kg, Weight Dosing, 12/13/21 15:53:00 CST    BLOOD SUGAR DIAGNOSTIC STRP      Accu Chek Glucometer, See Instructions, Use to check Blood sugar Once a day, # 1 EA, 0 Refill(s), Pharmacy: [place] [address] [facility], 163, cm, Height/Length Dosing, 12/13/21 15:53:00 CST, 82.1, kg, Weight Dosing, 12/13/21 15:53:00 CST    CLOPIDOGREL (PLAVIX) 75 MG TABLET        DICLOFENAC (VOLTAREN) 0.1 % OPHTHALMIC SOLUTION    1 drop 4 (four) times daily.    DICLOFENAC SODIUM (VOLTAREN) 1 % GEL    Apply 2 g topically once daily.    EMPAGLIFLOZIN (JARDIANCE) 10 MG TABLET    Take 10 mg by mouth once daily.    FLUTICASONE PROPIONATE (FLOVENT DISKUS) 100 MCG/ACTUATION INHALER    Inhale into the lungs 2 (two) times daily. Controller    GLIPIZIDE (GLUCOTROL) 5 MG TR24    Take 5 mg by mouth Daily.    LANCING DEVICE/LANCETS (ACCU-CHEK SOFT DEV LANCETS [facility]      Accu Chek Lancets, See Instructions, Use to test Blood  glucose once a day, # 50 EA, 11 Refill(s), Pharmacy: [facility] [address] [facility], 163, cm, Height/Length Dosing, 12/13/21 15:53:00 CST, 82.1, kg, Weight Dosing, 12/13/21 15:53:00 CST    LEVOTHYROXINE (SYNTHROID) 125 MCG TABLET        LISINOPRIL (PRINIVIL,ZESTRIL) 2.5 MG TABLET        METFORMIN (GLUCOPHAGE) 1000 MG TABLET    once daily at 6am.    METOPROLOL SUCCINATE (TOPROL-XL) 100 MG 24 HR TABLET        MONTELUKAST (SINGULAIR) 10 MG TABLET        ROSUVASTATIN (CRESTOR) 40 MG TAB    Take 10 mg by mouth every evening.   Changed and/or Refilled Medications    Modified Medication Previous Medication    PANTOPRAZOLE (PROTONIX) 40 MG TABLET pantoprazole (PROTONIX) 40 MG tablet       Take 1 tablet (40 mg total) by mouth once daily.    Take 1 tablet (40 mg total) by mouth once daily.   Discontinued Medications    SEMAGLUTIDE (OZEMPIC) 0.25 MG OR 0.5 MG(2 MG/1.5 ML) PEN INJECTOR    Inject 0.5 mg into the skin every 7 days.      Medication List with Changes/Refills   Current Medications    AMLODIPINE (NORVASC) 10 MG TABLET           Start Date: 4/20/2022 End Date: --    BLOOD SUGAR DIAGNOSTIC (ACCU-CHEK GUIDE TEST STRIPS [facility]      Accu Chek Test strips, See Instructions, Use to test Blood glucose once a day, # 50 EA, 11 Refill(s), Pharmacy: [facility] [address] [facility], 163, cm, Height/Length Dosing, 12/13/21 15:53:00 CST, 82.1, kg, Weight Dosing, 12/13/21 15:53:00 CST       Start Date: 1/24/2022 End Date: --    BLOOD SUGAR DIAGNOSTIC STRP      Accu Chek Glucometer, See Instructions, Use to check Blood sugar Once a day, # 1 EA, 0 Refill(s), Pharmacy: [facility] [place] [address] [facility], 163, cm, Height/Length Dosing, 12/13/21 15:53:00 CST, 82.1, kg, Weight Dosing, 12/13/21 15:53:00 CST       Start Date: 1/24/2022 End Date: --    CLOPIDOGREL (PLAVIX) 75 MG TABLET           Start Date: 4/11/2022 End Date: --    DICLOFENAC (VOLTAREN) 0.1 % OPHTHALMIC SOLUTION    1 drop 4 (four) times daily.       Start Date: --        End Date: --    DICLOFENAC SODIUM  (VOLTAREN) 1 % GEL    Apply 2 g topically once daily.       Start Date: --        End Date: --    EMPAGLIFLOZIN (JARDIANCE) 10 MG TABLET    Take 10 mg by mouth once daily.       Start Date: --        End Date: --    FLUTICASONE PROPIONATE (FLOVENT DISKUS) 100 MCG/ACTUATION INHALER    Inhale into the lungs 2 (two) times daily. Controller       Start Date: --        End Date: --    GLIPIZIDE (GLUCOTROL) 5 MG TR24    Take 5 mg by mouth Daily.       Start Date: 12/23/2021End Date: --    LANCING DEVICE/LANCETS (ACCU-CHEK SOFT DEV LANCETS MISC)      Accu Chek Lancets, See Instructions, Use to test Blood glucose once a day, # 50 EA, 11 Refill(s), Pharmacy: [place] [address] [facility], 163, cm, Height/Length Dosing, 12/13/21 15:53:00 CST, 82.1, kg, Weight Dosing, 12/13/21 15:53:00 CST       Start Date: 1/24/2022 End Date: --    LEVOTHYROXINE (SYNTHROID) 125 MCG TABLET           Start Date: 5/30/2022 End Date: --    LISINOPRIL (PRINIVIL,ZESTRIL) 2.5 MG TABLET           Start Date: 4/25/2022 End Date: --    METFORMIN (GLUCOPHAGE) 1000 MG TABLET    once daily at 6am.       Start Date: 1/3/2022  End Date: --    METOPROLOL SUCCINATE (TOPROL-XL) 100 MG 24 HR TABLET           Start Date: 3/29/2022 End Date: --    MONTELUKAST (SINGULAIR) 10 MG TABLET           Start Date: 3/20/2022 End Date: --    ROSUVASTATIN (CRESTOR) 40 MG TAB    Take 10 mg by mouth every evening.       Start Date: --        End Date: --   Changed and/or Refilled Medications    Modified Medication Previous Medication    PANTOPRAZOLE (PROTONIX) 40 MG TABLET pantoprazole (PROTONIX) 40 MG tablet       Take 1 tablet (40 mg total) by mouth once daily.    Take 1 tablet (40 mg total) by mouth once daily.       Start Date: 6/30/2022 End Date: --    Start Date: 6/14/2022 End Date: 6/30/2022   Discontinued Medications    SEMAGLUTIDE (OZEMPIC) 0.25 MG OR 0.5 MG(2 MG/1.5 ML) PEN INJECTOR    Inject 0.5 mg into the skin every 7 days.       Start Date: 12/13/2021End Date: 6/30/2022         Needs PCV 20 but we are currently out will give her her dose at her next visit    Follow up in about 6 months (around 12/30/2022) for WELLNESS, NURSE PRACTITIONER, with labs prior to visit.

## 2022-07-01 NOTE — TELEPHONE ENCOUNTER
----- Message from Deng Call MD sent at 7/1/2022  8:35 AM CDT -----  Labs look great, continue current management A1c at 6.8.

## 2022-07-08 NOTE — TELEPHONE ENCOUNTER
----- Message from Carmen Morales sent at 2022 10:46 AM CDT -----  Regarding: advice  Type:  Needs Medical Advice    Who Called: pt  Would the patient rather a call back or a response via [facility]? C/b  Best Call Back Number: [phone]  Additional Information: pt received samples of Jardiance from pcp but most the samples have .  Pt wants to know what to do with the  samples and would like new replacement ones

## 2022-07-08 NOTE — TELEPHONE ENCOUNTER
Spoke with patient who states she received samples of Jardiance from our office which are  she also states the pills do not look like the ones she was previously taking. Advised patient to come in  new samples and bring in the ones that were given to her. Patient verbalized understanding

## 2022-07-25 NOTE — TELEPHONE ENCOUNTER
----- Message from Amanda Patel sent at 7/25/2022  1:19 PM CDT -----  Regarding: Meds  .Type:  Needs Medical Advice    Who Called: Pt  Symptoms (please be specific):  SOB, coughing  How long has patient had these symptoms:    Pharmacy name and phone #:  [facility] [address]  Would the patient rather a call back or a response via [facility]? Call back  Best Call Back Number: [phone]  Additional Information: Wants Prednisone called in fro symptoms listed above..

## 2022-07-26 NOTE — TELEPHONE ENCOUNTER
Pt took COVID test and it was negative yesterday 7/25/22.  Her symptoms are: cough for 3 weeks, and runny nose.   She stated that she has had this before (related to her asthma) and you have sent in a medrol dose pack.   Pt would like to know what is called in if anything. She is unable to do a telemed.

## 2022-07-27 NOTE — TELEPHONE ENCOUNTER
----- Message from Jeffry Márquez sent at 7/27/2022  9:50 AM CDT -----  Regarding: RX REQUEST  Type:  Patient Returning Call    Who Called: pt  Who Left Message for Patient:   Does the patient know what this is regarding?: yes  Would the patient rather a call back or a response via [facility]?  Call back   Best Call Back Number: [phone]  Additional Information: methylPREDNISolone (MEDROL DOSEPACK)  needs to be sent to [facility] [place] [address]

## 2022-08-15 NOTE — TELEPHONE ENCOUNTER
----- Message from Earnest Diaz MA sent at 8/15/2022  9:32 AM CDT -----  Regarding: FW: referral  Please schedule pt appointment  ----- Message -----  From: Deng aCll MD  Sent: 8/12/2022  11:44 AM CDT  To: Earnest Diaz MA  Subject: RE: referral                                     Yes because I need to know why she passed out    ----- Message -----  From: Earnest Diaz MA  Sent: 8/12/2022  11:24 AM CDT  To: Deng Call MD  Subject: FW: referral                                     Does pt need appt?  ----- Message -----  From: Carmen Andrew  Sent: 8/12/2022  11:22 AM CDT  To: [facility] Staff  Subject: referral                                         Type:  Patient Requesting Referral    Who Called:pt's daughter Rochelle  Does the patient already have the specialty appointment scheduled?:  If yes, what is the date of that appointment?:  Referral to What Specialty:Nursing Specialties   Reason for Referral:home health  Does the patient want the referral with a specific physician?:  Is the specialist an [facility]?:  Patient Requesting a Response?:yes  Would the patient rather a call back or a response via [facility]? C/b  Best Call Back Number:[phone]  Additional Information: pt's daughter called looking to restart HH for mother. Pt passed out 8/1  And pt feels more comfortable having a medical professional assisting her daily life

## 2022-08-15 NOTE — TELEPHONE ENCOUNTER
Patient has productive cough, went to Pulmonologist last week had normal CXray, had a dizzy spell and fell 1 week ago getting up in the AM. She would like HH, but doesn;t feel well enough to come in for visit.

## 2022-08-16 NOTE — TELEPHONE ENCOUNTER
----- Message from Janine Walker sent at 8/16/2022  2:24 PM CDT -----  Regarding: call back  .Type:  Needs Medical Advice    Who Called: pt   Symptoms (please be specific):    How long has patient had these symptoms:    Pharmacy name and phone #:    Would the patient rather a call back or a response via [facility]? Call back   Best Call Back Number: [phone]  Additional Information: pt would like to speak with anastasia. She said she's been trying to get in touch since Friday but has had no luck. Attempted back line with no success.

## 2022-08-16 NOTE — TELEPHONE ENCOUNTER
----- Message from Carmen Morales sent at 8/16/2022 11:04 AM CDT -----  Regarding: Lissett  Patient would like Lissett to call her back, would not give any information.  Stated she gave all information to Lissett and then disconnected the call.

## 2022-08-23 PROBLEM — E66.9 OBESITY: Status: ACTIVE | Noted: 2022-01-01

## 2022-08-23 PROBLEM — J45.909 ASTHMA: Status: ACTIVE | Noted: 2022-01-01

## 2022-08-23 PROBLEM — R06.09 DOE (DYSPNEA ON EXERTION): Status: ACTIVE | Noted: 2022-01-01

## 2022-08-23 NOTE — ASSESSMENT & PLAN NOTE
[place] patient of myself and Dr. Reed.  Complains of MOTA, PND, orthopnea for the past 3 weeks all of which followed a syncopal episode cause of which is unknown.  Here today with heart rate of 110, some trace edema, lungs are clear she reports normal chest x-ray at her pulmonologist's office 2 weeks ago.  She was treated with Flovent inhalation and a course of steroids with no improvement in treatment.  She is going to see her cardiologist at 2:30 a.m. today advised need for D-dimer, EKG, BNP and echo.  High suspicion for heart failure verses  pulmonary embolism. Message sent to Dr Reed since she declined labs in our office

## 2022-08-23 NOTE — PROGRESS NOTES
Subjective:      Patient ID: Haley Dumont is a 82 y.o. female.    Chief Complaint: Follow-up (Need for home health referral)      HPI:    82 year old female here for syncope, MOTA  Syncope; reports pre- syncope 3 weeks ago   Does not have life alert  Says she was on the ground for 3 hours  Sats are 97% on RA  MOTA, +PND, + orthopnea  Going to see Dr Reed today  Dr Pleitez for pulmonology   On Flovent  No appetite, no recent increase in take of salt.  On Plavix only; HR at 110        Problem List Items Addressed This Visit        Pulmonary    Asthma       Cardiac/Vascular    MOTA (dyspnea on exertion)    Current Assessment & Plan     [place] patient of myself and Dr. Reed.  Complains of MOTA, PND, orthopnea for the past 3 weeks all of which followed a syncopal episode cause of which is unknown.  Here today with heart rate of 110, some trace edema, lungs are clear she reports normal chest x-ray at her pulmonologist's office 2 weeks ago.  She was treated with Flovent inhalation and a course of steroids with no improvement in treatment.  She is going to see her cardiologist at 2:30 a.m. today advised need for D-dimer, EKG, BNP and echo.  High suspicion for heart failure verses  pulmonary embolism. Message sent to Dr Reed since she declined labs in our office              Endocrine    Type 2 diabetes mellitus with hyperglycemia, without long-term current use of insulin - Primary    Obesity              Past Medical History:  Past Medical History:   Diagnosis Date    DM (diabetes mellitus)     Hypothyroidism, unspecified     Mild intermittent asthma, uncomplicated     Obesity, unspecified      Past Surgical History:   Procedure Laterality Date    CATARACT EXTRACTION  2010    EYE SURGERY  2010    HYSTERECTOMY  1974     Review of patient's allergies indicates:   Allergen Reactions    Morphine Anaphylaxis    Oxycodone-aspirin Anaphylaxis    Shellfish containing products      Other reaction(s): Hives     Sulfa (sulfonamide antibiotics)      Other reaction(s): Hives     Current Outpatient Medications on File Prior to Visit   Medication Sig Dispense Refill    amLODIPine (NORVASC) 10 MG tablet       blood sugar diagnostic (ACCU-CHEK GUIDE TEST STRIPS [facility])   Accu Chek Test strips, See Instructions, Use to test Blood glucose once a day, # 50 EA, 11 Refill(s), Pharmacy: [place] [facility] [address] [id number], 163, cm, Height/Length Dosing, 12/13/21 15:53:00 CST, 82.1, kg, Weight Dosing, 12/13/21 15:53:00 CST      blood sugar diagnostic Strp   Accu Chek Glucometer, See Instructions, Use to check Blood sugar Once a day, # 1 EA, 0 Refill(s), Pharmacy: [place] [address] [facility], 163, cm, Height/Length Dosing, 12/13/21 15:53:00 CST, 82.1, kg, Weight Dosing, 12/13/21 15:53:00 CST      clopidogreL (PLAVIX) 75 mg tablet       diclofenac (VOLTAREN) 0.1 % ophthalmic solution 1 drop 4 (four) times daily.      diclofenac sodium (VOLTAREN) 1 % Gel Apply 2 g topically once daily.      empagliflozin (JARDIANCE) 10 mg tablet Take 10 mg by mouth once daily.      fluticasone propionate (FLOVENT DISKUS) 100 mcg/actuation inhaler Inhale into the lungs 2 (two) times daily. Controller      glipiZIDE (GLUCOTROL) 5 MG TR24 Take 5 mg by mouth Daily.      lancing device/lancets (ACCU-CHEK SOFT DEV LANCETS [facility])   Accu Chek Lancets, See Instructions, Use to test Blood glucose once a day, # 50 EA, 11 Refill(s), Pharmacy: [place] [facility] [address] [facility], 163, cm, Height/Length Dosing, 12/13/21 15:53:00 CST, 82.1, kg, Weight Dosing, 12/13/21 15:53:00 CST      levothyroxine (SYNTHROID) 125 MCG tablet       lisinopriL (PRINIVIL,ZESTRIL) 2.5 MG tablet       metFORMIN (GLUCOPHAGE) 1000 MG tablet Take 500 mg by mouth 2 (two) times daily.      metoprolol succinate (TOPROL-XL) 100 MG 24 hr tablet       montelukast (SINGULAIR) 10 mg tablet       pantoprazole (PROTONIX) 40 MG tablet Take 1 tablet (40 mg total) by mouth once daily. 90 tablet 3    rosuvastatin (CRESTOR) 40  MG Tab Take 10 mg by mouth every evening.       No current facility-administered medications on file prior to visit.     Social History     Socioeconomic History    Marital status:    Tobacco Use    Smoking status: Never Smoker    Smokeless tobacco: Never Used   Substance and Sexual Activity    Alcohol use: Not Currently    Drug use: Never    Sexual activity: Not Currently     Family History   Problem Relation Age of Onset    Arthritis Mother     Miscarriages / Stillbirths Mother     Diabetes Father     Stroke Father     Heart disease Brother     Hypertension Brother            Review of Systems  Constitutional: No fever,  no fatigue, no chills, no night sweats, no weight gain, no weight loss, no changes in appetite.   Eye: No redness, no acute vision loss, no blurred vision, no double vision, no eye pain  ENMT: No sore throat, no nasal drainage, no nose bleeds,  no headache, no ear pain, no ear drainage, no acute hearing loss  Respiratory: No cough, no sputum production, +shortness of breath, no hemoptysis, no wheezing.  Cardiovascular: No chest pain, no chest tightness, + MOTA, + PND, + orthopnea, no swelling, no palpitations.  Gastrointestinal: No abdominal pain, no nausea, no vomiting, no diarrhea, no constipation, no difficulty swallowing, no change in bowel habits, no rectal bleeding  Genitourinary: no urgency, no frequency, no burning or pain when urinating, no blood in urine, no incontinence  Heme/Lymph: No easy bruising and/or bleeding, no swollen or painful glands.  Endocrine: No polyuria, no polydipsia, no polyphagia, no heat or cold intolerance.  Musculoskeletal: No muscle pain, no muscle weakness, no joint pain, no red or swollen joints.  Integumentary: No rash, no pruritis, no hair or nail changes.  Neurologic: No dizziness, no fainting, no tremors, no tingling and/ or numbness.  Psychiatric: No anxiety, no depression, no memory loss  All Other ROS: Negative with exception of what is  "documented in the history of present illness     Objective:   /62 (BP Location: Left arm, Patient Position: Sitting, BP Method: Medium (Manual))   Pulse 110   Temp 97.3 °F (36.3 °C) (Temporal)   Resp 16   Ht 5' 3" (1.6 m)   Wt 76.9 kg (169 lb 8 oz)   SpO2 97%   BMI 30.03 kg/m²     Physical Exam  General : Alert and oriented, No acute distress, well, developed, well nourished, afebrile ; mild dyspnea  Eye : PERRLA. EOMI. Normal conjunctiva without injection. Sclerae are nonicteric. No pallor.  HEENT : Normocephalic. Neck supple. Normal hearing. Oral mucosa is moist.  Respiratory : Lungs are clear to auscultation bilaterally, non-labored. Symmetrical chest wall expansion. No crackles, wheeze, or rhonci.  Cardiovascular : Normal rate, Regular rhythm. No murmurs, rubs, or gallops. Pulses 2+ in all extremities. 1+ Edema.  Gastrointestinal : Soft, nontender, non-distended, bowel sounds normal, no organomegaly, no guarding, no rebound.  Musculoskeletal : Normal ROM.  No muscle tenderness.  Integumentary : Warm to touch. Intact. No rash.    Neurologic : Alert and oriented. No focal deficits.   Psychiatric : Cooperative, Appropriate mood & affect. Normal judgment.          Assessment:     1. Type 2 diabetes mellitus with hyperglycemia, without long-term current use of insulin    2. MOTA (dyspnea on exertion)    3. Obesity, unspecified classification, unspecified obesity type, unspecified whether serious comorbidity present    4. Asthma, unspecified asthma severity, unspecified whether complicated, unspecified whether persistent                  Plan:       I am having Haley Dumont maintain her amLODIPine, metFORMIN, clopidogreL, levothyroxine, lisinopriL, metoprolol succinate, montelukast, fluticasone propionate, rosuvastatin, JARDIANCE, blood sugar diagnostic, lancing device/lancets (ACCU-CHEK SOFT DEV LANCETS MISC), blood sugar diagnostic (ACCU-CHEK GUIDE TEST STRIPS MISC), glipiZIDE, diclofenac, diclofenac " sodium, and pantoprazole.      Problem List Items Addressed This Visit        Pulmonary    Asthma       Cardiac/Vascular    MOTA (dyspnea on exertion)     [place] patient of myself and Dr. Reed.  Complains of MOTA, PND, orthopnea for the past 3 weeks all of which followed a syncopal episode cause of which is unknown.  Here today with heart rate of 110, some trace edema, lungs are clear she reports normal chest x-ray at her pulmonologist's office 2 weeks ago.  She was treated with Flovent inhalation and a course of steroids with no improvement in treatment.  She is going to see her cardiologist at 2:30 a.m. today advised need for D-dimer, EKG, BNP and echo.  High suspicion for heart failure verses  pulmonary embolism. Message sent to Dr Reed since she declined labs in our office              Endocrine    Type 2 diabetes mellitus with hyperglycemia, without long-term current use of insulin - Primary    Obesity            Haley was seen today for follow-up.    Diagnoses and all orders for this visit:    Type 2 diabetes mellitus with hyperglycemia, without long-term current use of insulin    MOTA (dyspnea on exertion)    Obesity, unspecified classification, unspecified obesity type, unspecified whether serious comorbidity present    Asthma, unspecified asthma severity, unspecified whether complicated, unspecified whether persistent               [unfilled]  No orders of the defined types were placed in this encounter.      Medication List with Changes/Refills   Current Medications    AMLODIPINE (NORVASC) 10 MG TABLET        BLOOD SUGAR DIAGNOSTIC (ACCU-CHEK GUIDE TEST STRIPS [facility])      Accu Chek Test strips, See Instructions, Use to test Blood glucose once a day, # 50 EA, 11 Refill(s), Pharmacy: [place] [address] [facility], 163, cm, Height/Length Dosing, 12/13/21 15:53:00 CST, 82.1, kg, Weight Dosing, 12/13/21 15:53:00 CST    BLOOD SUGAR DIAGNOSTIC STRP      Accu Chek Glucometer, See Instructions, Use to check Blood  sugar Once a day, # 1 EA, 0 Refill(s), Pharmacy: [place] [address] [facility], 163, cm, Height/Length Dosing, 12/13/21 15:53:00 CST, 82.1, kg, Weight Dosing, 12/13/21 15:53:00 CST    CLOPIDOGREL (PLAVIX) 75 MG TABLET        DICLOFENAC (VOLTAREN) 0.1 % OPHTHALMIC SOLUTION    1 drop 4 (four) times daily.    DICLOFENAC SODIUM (VOLTAREN) 1 % GEL    Apply 2 g topically once daily.    EMPAGLIFLOZIN (JARDIANCE) 10 MG TABLET    Take 10 mg by mouth once daily.    FLUTICASONE PROPIONATE (FLOVENT DISKUS) 100 MCG/ACTUATION INHALER    Inhale into the lungs 2 (two) times daily. Controller    GLIPIZIDE (GLUCOTROL) 5 MG TR24    Take 5 mg by mouth Daily.    LANCING DEVICE/LANCETS (ACCU-CHEK SOFT DEV LANCETS [facility] [place] [facility])      Accu Chek Lancets, See Instructions, Use to test Blood glucose once a day, # 50 EA, 11 Refill(s), Pharmacy: [place] [address] [facility], 163, cm, Height/Length Dosing, 12/13/21 15:53:00 CST, 82.1, kg, Weight Dosing, 12/13/21 15:53:00 CST    LEVOTHYROXINE (SYNTHROID) 125 MCG TABLET        LISINOPRIL (PRINIVIL,ZESTRIL) 2.5 MG TABLET        METFORMIN (GLUCOPHAGE) 1000 MG TABLET    Take 500 mg by mouth 2 (two) times daily.    METOPROLOL SUCCINATE (TOPROL-XL) 100 MG 24 HR TABLET        MONTELUKAST (SINGULAIR) 10 MG TABLET        PANTOPRAZOLE (PROTONIX) 40 MG TABLET    Take 1 tablet (40 mg total) by mouth once daily.    ROSUVASTATIN (CRESTOR) 40 MG TAB    Take 10 mg by mouth every evening.      Medication List with Changes/Refills   Current Medications    AMLODIPINE (NORVASC) 10 MG TABLET           Start Date: 4/20/2022 End Date: --    BLOOD SUGAR DIAGNOSTIC (ACCU-CHEK GUIDE TEST STRIPS [facility] [place] [facility])      Accu Chek Test strips, See Instructions, Use to test Blood glucose once a day, # 50 EA, 11 Refill(s), Pharmacy: [place] [address] [facility], 163, cm, Height/Length Dosing, 12/13/21 15:53:00 CST, 82.1, kg, Weight Dosing, 12/13/21 15:53:00 CST       Start Date: 1/24/2022 End Date: --    BLOOD SUGAR DIAGNOSTIC STRP      Accu Chek Glucometer, See  Instructions, Use to check Blood sugar Once a day, # 1 EA, 0 Refill(s), Pharmacy: [place] [address] [facility], 163, cm, Height/Length Dosing, 12/13/21 15:53:00 CST, 82.1, kg, Weight Dosing, 12/13/21 15:53:00 CST       Start Date: 1/24/2022 End Date: --    CLOPIDOGREL (PLAVIX) 75 MG TABLET           Start Date: 4/11/2022 End Date: --    DICLOFENAC (VOLTAREN) 0.1 % OPHTHALMIC SOLUTION    1 drop 4 (four) times daily.       Start Date: --        End Date: --    DICLOFENAC SODIUM (VOLTAREN) 1 % GEL    Apply 2 g topically once daily.       Start Date: --        End Date: --    EMPAGLIFLOZIN (JARDIANCE) 10 MG TABLET    Take 10 mg by mouth once daily.       Start Date: --        End Date: --    FLUTICASONE PROPIONATE (FLOVENT DISKUS) 100 MCG/ACTUATION INHALER    Inhale into the lungs 2 (two) times daily. Controller       Start Date: --        End Date: --    GLIPIZIDE (GLUCOTROL) 5 MG TR24    Take 5 mg by mouth Daily.       Start Date: 12/23/2021End Date: --    LANCING DEVICE/LANCETS (ACCU-CHEK SOFT DEV LANCETS [facility])      Accu Chek Lancets, See Instructions, Use to test Blood glucose once a day, # 50 EA, 11 Refill(s), Pharmacy: [place] [address] [facility], 163, cm, Height/Length Dosing, 12/13/21 15:53:00 CST, 82.1, kg, Weight Dosing, 12/13/21 15:53:00 CST       Start Date: 1/24/2022 End Date: --    LEVOTHYROXINE (SYNTHROID) 125 MCG TABLET           Start Date: 5/30/2022 End Date: --    LISINOPRIL (PRINIVIL,ZESTRIL) 2.5 MG TABLET           Start Date: 4/25/2022 End Date: --    METFORMIN (GLUCOPHAGE) 1000 MG TABLET    Take 500 mg by mouth 2 (two) times daily.       Start Date: 1/3/2022  End Date: --    METOPROLOL SUCCINATE (TOPROL-XL) 100 MG 24 HR TABLET           Start Date: 3/29/2022 End Date: --    MONTELUKAST (SINGULAIR) 10 MG TABLET           Start Date: 3/20/2022 End Date: --    PANTOPRAZOLE (PROTONIX) 40 MG TABLET    Take 1 tablet (40 mg total) by mouth once daily.       Start Date: 6/30/2022 End Date: --    ROSUVASTATIN (CRESTOR)  40 MG TAB    Take 10 mg by mouth every evening.       Start Date: --        End Date: --            No follow-ups on file.

## 2022-08-24 NOTE — TELEPHONE ENCOUNTER
Spoke to pt and she stated that Dr. Reed has a Monitor on her and she would like to know if she could still have [facility]

## 2022-08-24 NOTE — TELEPHONE ENCOUNTER
----- Message from Boone Dwight sent at 8/24/2022  2:10 PM CDT -----  .Type:  Needs Medical Advice    Who Called: pt  Would the patient rather a call back or a response via [facility]? Call back  Best Call Back Number: [phone]  Additional Information: wants to speak with Earnest Bautista MA,

## 2022-08-24 NOTE — TELEPHONE ENCOUNTER
Referral ordered.   Spoke to pt and she stated that by the time she saw Dr. Reed the  Lab was closed, so she was unable to do labs yesterday and she will get them done tomorrow.   She is on a Holter Monitor until Monday, and he started her on Lasix which she stated that she is going to start on Friday.

## 2022-09-26 NOTE — TELEPHONE ENCOUNTER
----- Message from Deng Call MD sent at 9/26/2022  1:22 PM CDT -----  So Doctor Maik (her pulmonologist) has her listed as having asthma, not COPD  Her pulmonary function test from December 2021 are NOT consistent with a diagnosis of COPD  ----- Message -----  From: Baudilio Martinez  Sent: 9/26/2022  11:47 AM CDT  To: Deng Call MD    Pt last OV was on 08/23. I spoke with Taylor with [facility]. She wanted to see if you would be able to addendum the last note it need to also have COPD as her diagnosis for insurance.

## 2022-11-17 NOTE — TELEPHONE ENCOUNTER
Miguel Angel from [facility] called stating that pt believes she has a yeast infection. Pt symptoms are vaginal itching and discharge. They would like to know if you could call something in for it.  Callback: [phone]

## 2022-12-29 NOTE — TELEPHONE ENCOUNTER
----- Message from Earnest Diaz MA sent at 12/29/2022 11:33 AM CST -----  Regarding: PV 1/5/23 @ 1:20 Dr. Atkins  1. Are there any outstanding tasks in the patient's chart? Yes, fasting labs    2. Is there any documentation in the chart? No    3.Has patient been seen in an ER, Urgent care clinic, or been admitted since last visit?  If yes, When, where, and why    4. Has patient seen any other healthcare providers since last visit?  If yes, when, where, and why    5. Has patient had any bloodwork or XR done since last visit?    6. Is patient signed up for patient portal?

## 2023-01-01 ENCOUNTER — TELEPHONE (OUTPATIENT)
Dept: INTERNAL MEDICINE | Facility: CLINIC | Age: 83
End: 2023-01-01
Payer: MEDICARE

## 2023-01-01 ENCOUNTER — PATIENT OUTREACH (OUTPATIENT)
Dept: HOME HEALTH SERVICES | Facility: HOSPITAL | Age: 83
End: 2023-01-01
Payer: MEDICARE

## 2023-01-01 ENCOUNTER — EXTERNAL HOME HEALTH (OUTPATIENT)
Dept: HOME HEALTH SERVICES | Facility: HOSPITAL | Age: 83
End: 2023-01-01
Payer: MEDICARE

## 2023-01-04 NOTE — TELEPHONE ENCOUNTER
Spoke to  Nurse and she stated that pt has an O2 Stat: 93, Congestion, sore throat, bilateral lower leg edema, chest pain, and shortness of breath, heart rate is ranging 114 - 122 BPM, and B/P is at 153/64.  I verbally spoke to Dr. Atkins and she recommended that pt go to ED for full work up.

## 2023-01-04 NOTE — TELEPHONE ENCOUNTER
----- Message from Brianna Carrasco sent at 1/4/2023 11:22 AM CST -----  Regarding: advice  Pts home health called about having chest pain, sore throat, congestion, might have the flu or covid and worried about it being cardiac problems as well. She stated that she would like a call back with the nurse sarah beth [phone]